# Patient Record
Sex: MALE | Race: WHITE | NOT HISPANIC OR LATINO | Employment: OTHER | URBAN - NONMETROPOLITAN AREA
[De-identification: names, ages, dates, MRNs, and addresses within clinical notes are randomized per-mention and may not be internally consistent; named-entity substitution may affect disease eponyms.]

---

## 2017-08-07 ENCOUNTER — OFFICE VISIT (OUTPATIENT)
Dept: CARDIAC SURGERY | Facility: CLINIC | Age: 57
End: 2017-08-07

## 2017-08-07 VITALS
DIASTOLIC BLOOD PRESSURE: 75 MMHG | HEIGHT: 77 IN | BODY MASS INDEX: 37.19 KG/M2 | HEART RATE: 90 BPM | SYSTOLIC BLOOD PRESSURE: 187 MMHG | WEIGHT: 315 LBS | OXYGEN SATURATION: 93 %

## 2017-08-07 DIAGNOSIS — I10 ESSENTIAL HYPERTENSION: ICD-10-CM

## 2017-08-07 DIAGNOSIS — Z79.4 TYPE 2 DIABETES MELLITUS WITH DIABETIC PERIPHERAL ANGIOPATHY WITHOUT GANGRENE, WITH LONG-TERM CURRENT USE OF INSULIN (HCC): ICD-10-CM

## 2017-08-07 DIAGNOSIS — I63.9 CEREBROVASCULAR ACCIDENT (CVA), UNSPECIFIED MECHANISM (HCC): ICD-10-CM

## 2017-08-07 DIAGNOSIS — J43.1 PANLOBULAR EMPHYSEMA (HCC): ICD-10-CM

## 2017-08-07 DIAGNOSIS — E11.51 TYPE 2 DIABETES MELLITUS WITH DIABETIC PERIPHERAL ANGIOPATHY WITHOUT GANGRENE, WITH LONG-TERM CURRENT USE OF INSULIN (HCC): ICD-10-CM

## 2017-08-07 DIAGNOSIS — I73.9 PVD (PERIPHERAL VASCULAR DISEASE) (HCC): ICD-10-CM

## 2017-08-07 DIAGNOSIS — I70.219 ATHEROSCLEROSIS OF ARTERY OF EXTREMITY WITH INTERMITTENT CLAUDICATION (HCC): Primary | ICD-10-CM

## 2017-08-07 DIAGNOSIS — IMO0001 CLASS 3 OBESITY DUE TO EXCESS CALORIES WITH SERIOUS COMORBIDITY AND BODY MASS INDEX (BMI) OF 40.0 TO 44.9 IN ADULT: ICD-10-CM

## 2017-08-07 DIAGNOSIS — E78.2 MIXED HYPERLIPIDEMIA: ICD-10-CM

## 2017-08-07 PROCEDURE — 99204 OFFICE O/P NEW MOD 45 MIN: CPT | Performed by: THORACIC SURGERY (CARDIOTHORACIC VASCULAR SURGERY)

## 2017-08-07 RX ORDER — LISINOPRIL 40 MG/1
40 TABLET ORAL DAILY
COMMUNITY

## 2017-08-07 RX ORDER — GABAPENTIN 600 MG/1
600 TABLET ORAL 3 TIMES DAILY
COMMUNITY

## 2017-08-07 RX ORDER — ISOSORBIDE MONONITRATE 30 MG/1
30 TABLET, EXTENDED RELEASE ORAL DAILY
COMMUNITY

## 2017-08-07 RX ORDER — INSULIN GLARGINE 100 [IU]/ML
INJECTION, SOLUTION SUBCUTANEOUS DAILY
COMMUNITY

## 2017-08-07 RX ORDER — HYDRALAZINE HYDROCHLORIDE 25 MG/1
25 TABLET, FILM COATED ORAL 3 TIMES DAILY
COMMUNITY

## 2017-08-07 RX ORDER — BUDESONIDE AND FORMOTEROL FUMARATE DIHYDRATE 160; 4.5 UG/1; UG/1
2 AEROSOL RESPIRATORY (INHALATION)
COMMUNITY

## 2017-08-07 RX ORDER — FENOFIBRATE 145 MG/1
145 TABLET, COATED ORAL DAILY
COMMUNITY

## 2017-08-07 RX ORDER — ASPIRIN 81 MG/1
81 TABLET ORAL DAILY
COMMUNITY

## 2017-08-07 RX ORDER — DULOXETIN HYDROCHLORIDE 60 MG/1
60 CAPSULE, DELAYED RELEASE ORAL DAILY
COMMUNITY

## 2017-08-07 RX ORDER — CARVEDILOL 12.5 MG/1
12.5 TABLET ORAL 2 TIMES DAILY WITH MEALS
COMMUNITY

## 2017-08-07 RX ORDER — ALBUTEROL SULFATE 90 UG/1
2 AEROSOL, METERED RESPIRATORY (INHALATION) EVERY 4 HOURS PRN
COMMUNITY

## 2017-08-07 RX ORDER — CILOSTAZOL 100 MG/1
100 TABLET ORAL
Qty: 60 TABLET | Refills: 11 | Status: SHIPPED | OUTPATIENT
Start: 2017-08-07 | End: 2018-01-01

## 2017-08-07 RX ORDER — AMLODIPINE BESYLATE 10 MG/1
10 TABLET ORAL DAILY
COMMUNITY

## 2017-08-07 RX ORDER — SPIRONOLACTONE 25 MG/1
25 TABLET ORAL DAILY
COMMUNITY

## 2017-08-07 RX ORDER — ATORVASTATIN CALCIUM 10 MG/1
10 TABLET, FILM COATED ORAL DAILY
COMMUNITY

## 2017-08-07 RX ORDER — HYDROCODONE BITARTRATE AND ACETAMINOPHEN 10; 325 MG/1; MG/1
TABLET ORAL
Refills: 0 | COMMUNITY
Start: 2017-07-05

## 2017-08-08 LAB
BH CV LOWER ARTERIAL LEFT ABI RATIO: 1.03
BH CV LOWER ARTERIAL LEFT DORSALIS PEDIS SYS MAX: 137 MMHG
BH CV LOWER ARTERIAL LEFT POST TIBIAL SYS MAX: 158 MMHG
BH CV LOWER ARTERIAL RIGHT ABI RATIO: 0.71
BH CV LOWER ARTERIAL RIGHT DORSALIS PEDIS SYS MAX: 93 MMHG
BH CV LOWER ARTERIAL RIGHT POST TIBIAL SYS MAX: 108 MMHG
UPPER ARTERIAL LEFT ARM BRACHIAL SYS MAX: 164 MMHG
UPPER ARTERIAL RIGHT ARM BRACHIAL SYS MAX: 153 MMHG

## 2017-11-19 PROBLEM — I73.9 PERIPHERAL VASCULAR DISEASE (HCC): Status: ACTIVE | Noted: 2017-11-19

## 2017-11-19 PROBLEM — I70.219 ATHEROSCLEROSIS OF ARTERY OF EXTREMITY WITH INTERMITTENT CLAUDICATION (HCC): Status: ACTIVE | Noted: 2017-11-19

## 2017-11-19 PROBLEM — E11.9 DIABETES MELLITUS (HCC): Status: ACTIVE | Noted: 2017-11-19

## 2017-11-19 PROBLEM — IMO0001 CLASS 3 OBESITY DUE TO EXCESS CALORIES WITH BODY MASS INDEX (BMI) OF 40.0 TO 44.9 IN ADULT: Status: ACTIVE | Noted: 2017-11-19

## 2017-11-19 PROBLEM — J44.9 COPD (CHRONIC OBSTRUCTIVE PULMONARY DISEASE) (HCC): Status: ACTIVE | Noted: 2017-11-19

## 2017-11-19 PROBLEM — I10 HYPERTENSION: Status: ACTIVE | Noted: 2017-11-19

## 2017-11-19 PROBLEM — I63.9 CEREBROVASCULAR ACCIDENT (HCC): Status: ACTIVE | Noted: 2017-11-19

## 2017-11-19 PROBLEM — E66.9 OBESITY: Status: ACTIVE | Noted: 2017-11-19

## 2017-11-19 PROBLEM — E78.5 HYPERLIPIDEMIA: Status: ACTIVE | Noted: 2017-11-19

## 2017-11-19 NOTE — PROGRESS NOTES
8/7/2017    Steven R Midkiff  1960    Chief Complaint:    Chief Complaint   Patient presents with   • Peripheral Vascular Disease       HPI:      PCP:  Jhony Flores MD, Nasir LIZ    57 y.o. male with HTN(stable, increased risk stroke), hyperlipidemia(stable, increased risk cardiovascular events), COPD(stable, increased risk pulmonary complications), DM2(stable, increased risk cardiovascular events), Morbid Obesity(uncontrolled, BMI 45, increased risk cardiovascular events), PVD(new, increased risk cardiovascular events).  smokes 1/2 PPD.  Moderate legs hurt all the time x 2 years.  Eases up some with rest.  Venous stasis changes.  Moderate bilateral knee pain.  .  No TIA stroke amaurosis.  No MI claudication. No other associated signs, symptoms or modifying factors.    5/2013 Carotid Duplex:  YUKO 0-49% antegrade vert.  LICA 0-49% antegrade vert.  4/2014 ECG:  NSR 65, QTc 422, LBBB  5/2014 Nuclear myocardial stress test:  EF 53%, no ischemia.  8/7/2017 ANDREA:  RIGHT 0.71 tri/biphasic.  LEFT 1.0 triphasic.    The following portions of the patient's history were reviewed and updated as appropriate: allergies, current medications, past family history, past medical history, past social history, past surgical history and problem list.  Recent images independently reviewed.  Available laboratory values reviewed.    PMH:  Past Medical History:   Diagnosis Date   • Cerebrovascular accident    • COPD (chronic obstructive pulmonary disease)    • Diabetes mellitus    • History of echocardiogram     Technically suboptimally study due to morbid obesity. Mild CLVH with early diastolic dysfunction. EF 60-65%. AV mildly sclerotic. Mitral and tricuspid valves appear to be grossly normal.   • Hyperlipidemia    • Hypertension    • Obesity     morbid   • Peripheral vascular disease        ALLERGIES:  No Known Allergies      MEDICATIONS:    Current Outpatient Prescriptions:   •  albuterol (PROVENTIL HFA;VENTOLIN HFA)  108 (90 BASE) MCG/ACT inhaler, Inhale 2 puffs Every 4 (Four) Hours As Needed for Wheezing., Disp: , Rfl:   •  amLODIPine (NORVASC) 10 MG tablet, Take 10 mg by mouth Daily., Disp: , Rfl:   •  aspirin 81 MG EC tablet, Take 81 mg by mouth Daily., Disp: , Rfl:   •  atorvastatin (LIPITOR) 10 MG tablet, Take 10 mg by mouth Daily., Disp: , Rfl:   •  budesonide-formoterol (SYMBICORT) 160-4.5 MCG/ACT inhaler, Inhale 2 puffs 2 (Two) Times a Day., Disp: , Rfl:   •  carvedilol (COREG) 12.5 MG tablet, Take 12.5 mg by mouth 2 (Two) Times a Day With Meals., Disp: , Rfl:   •  DULoxetine (CYMBALTA) 60 MG capsule, Take 60 mg by mouth Daily., Disp: , Rfl:   •  fenofibrate (TRICOR) 145 MG tablet, Take 145 mg by mouth Daily., Disp: , Rfl:   •  gabapentin (NEURONTIN) 600 MG tablet, Take 600 mg by mouth 3 (Three) Times a Day., Disp: , Rfl:   •  hydrALAZINE (APRESOLINE) 25 MG tablet, Take 25 mg by mouth 3 (Three) Times a Day., Disp: , Rfl:   •  insulin aspart (novoLOG FLEXPEN) 100 UNIT/ML solution pen-injector sc pen, Inject  under the skin 3 (Three) Times a Day With Meals., Disp: , Rfl:   •  Insulin Glargine (LANTUS SOLOSTAR) 100 UNIT/ML injection pen, Inject  under the skin., Disp: , Rfl:   •  insulin glargine (LANTUS) 100 UNIT/ML injection, Inject  under the skin Daily., Disp: , Rfl:   •  isosorbide mononitrate (IMDUR) 30 MG 24 hr tablet, Take 30 mg by mouth Daily., Disp: , Rfl:   •  lisinopril (PRINIVIL,ZESTRIL) 40 MG tablet, Take 40 mg by mouth Daily., Disp: , Rfl:   •  metFORMIN (GLUCOPHAGE) 1000 MG tablet, Take 1,000 mg by mouth 2 (Two) Times a Day With Meals., Disp: , Rfl:   •  spironolactone (ALDACTONE) 25 MG tablet, Take 25 mg by mouth Daily., Disp: , Rfl:   •  Umeclidinium Bromide (INCRUSE ELLIPTA) 62.5 MCG/INH aerosol powder , Inhale., Disp: , Rfl:   •  cilostazol (PLETAL) 100 MG tablet, Take 1 tablet by mouth 2 (Two) Times a Day Before Meals., Disp: 60 tablet, Rfl: 11  •  HYDROcodone-acetaminophen (NORCO)  MG per tablet,  TK 1 T PO Q 6 H PRN FOR PAIN FOR 30 DAYS, Disp: , Rfl: 0    Review of Systems   Review of Systems   Constitution: Positive for weight loss. Negative for malaise/fatigue and night sweats.   HENT: Negative for hearing loss, hoarse voice and stridor.    Eyes: Negative for vision loss in left eye, vision loss in right eye and visual disturbance.   Cardiovascular: Positive for leg swelling. Negative for chest pain and palpitations.   Respiratory: Positive for shortness of breath, sleep disturbances due to breathing and wheezing. Negative for cough and hemoptysis.    Hematologic/Lymphatic: Negative for adenopathy and bleeding problem. Does not bruise/bleed easily.   Skin: Positive for poor wound healing. Negative for color change and rash.   Musculoskeletal: Positive for arthritis, back pain and neck pain. Negative for muscle weakness.   Gastrointestinal: Negative for abdominal pain, dysphagia and heartburn.   Neurological: Negative for dizziness, numbness and seizures.   Psychiatric/Behavioral: Positive for depression. Negative for altered mental status and memory loss. The patient is not nervous/anxious.        Physical Exam   Physical Exam   Constitutional: He is oriented to person, place, and time. He is active and cooperative. He does not appear ill. No distress.   HENT:   Head: Atraumatic.   Right Ear: Hearing normal.   Left Ear: Hearing normal.   Nose: No nasal deformity. No epistaxis.   Mouth/Throat: He does not have dentures. Normal dentition.   Eyes: Conjunctivae and lids are normal. Right pupil is round and reactive. Left pupil is round and reactive.   Neck: No JVD present. Carotid bruit is not present. No tracheal deviation present. No thyroid mass and no thyromegaly present.   Cardiovascular: Normal rate and regular rhythm.    No murmur heard.  Pulses:       Carotid pulses are 2+ on the right side, and 2+ on the left side.       Radial pulses are 2+ on the right side, and 2+ on the left side.        Femoral  pulses are 2+ on the right side, and 2+ on the left side.       Dorsalis pedis pulses are 1+ on the right side, and 2+ on the left side.        Posterior tibial pulses are 1+ on the right side, and 2+ on the left side.   Pulmonary/Chest: Effort normal and breath sounds normal.   Abdominal: Soft. He exhibits no distension and no mass. There is no splenomegaly or hepatomegaly. There is no tenderness.   Musculoskeletal: He exhibits no deformity.   Gait normal.    Lymphadenopathy:     He has no cervical adenopathy.        Right: No supraclavicular adenopathy present.        Left: No supraclavicular adenopathy present.   Neurological: He is alert and oriented to person, place, and time. He has normal strength.   Skin: Skin is warm and dry. No cyanosis or erythema. No pallor.   mild venous staining  Callous 1st toes, fungal nails.   Psychiatric: He has a normal mood and affect. His speech is normal. Judgment and thought content normal.     Bun 11 creat 0.7    ASSESSMENT:  Tayo was seen today for peripheral vascular disease.    Diagnoses and all orders for this visit:    Atherosclerosis of artery of extremity with intermittent claudication    PVD (peripheral vascular disease)  -     Doppler Ankle Brachial Index Single Level CAR    Cerebrovascular accident (CVA), unspecified mechanism    Mixed hyperlipidemia    Essential hypertension    Panlobular emphysema    Class 3 obesity due to excess calories with serious comorbidity and body mass index (BMI) of 40.0 to 44.9 in adult    Type 2 diabetes mellitus with diabetic peripheral angiopathy without gangrene, with long-term current use of insulin    Other orders  -     cilostazol (PLETAL) 100 MG tablet; Take 1 tablet by mouth 2 (Two) Times a Day Before Meals.    PLAN:  Detailed discussion with Steven R Midkiff regarding situation and options.  Moderate PVD with claudication.  Multiple risk factors with severe comorbidities.  No intervention indicated at this time.  Will follow  with interval imaging.  Risks, benefits discussed.  Understands and wishes to proceed with plan.    Return in 6 months with ANDREA  Pletal 100mg BID    Return after above studies complete  Recommended regular physical activity, progressive walking program.  Continue current medications as directed.  Will Obtain relevant old records.    Thank you for the opportunity to participate in this patient's care.    Copy to primary care provider.    EMR Dragon/Transcription disclaimer:   Much of this encounter note is an electronic transcription/translation of spoken language to printed text. The electronic translation of spoken language may permit erroneous, or at times, nonsensical words or phrases to be inadvertently transcribed; Although I have reviewed the note for such errors, some may still exist.

## 2017-12-05 ENCOUNTER — OFFICE VISIT (OUTPATIENT)
Dept: SLEEP MEDICINE | Facility: HOSPITAL | Age: 57
End: 2017-12-05

## 2017-12-05 VITALS
WEIGHT: 315 LBS | BODY MASS INDEX: 37.19 KG/M2 | DIASTOLIC BLOOD PRESSURE: 76 MMHG | HEIGHT: 77 IN | SYSTOLIC BLOOD PRESSURE: 134 MMHG

## 2017-12-05 DIAGNOSIS — G25.81 RESTLESS LEGS SYNDROME (RLS): ICD-10-CM

## 2017-12-05 DIAGNOSIS — G47.33 OBSTRUCTIVE SLEEP APNEA, ADULT: Primary | ICD-10-CM

## 2017-12-05 PROCEDURE — 99213 OFFICE O/P EST LOW 20 MIN: CPT | Performed by: INTERNAL MEDICINE

## 2017-12-05 NOTE — PROGRESS NOTES
Sleep Clinic Follow Up    Date: 2017  Primary Care Physician: Jhony Flores MD      Interim History (1/3):  Since the last visit on 2016, patient has:    EFREM - uses a machine that was gifted to him.  Unfortunately he does not have a data card and he did not bring the machine in for evaluation.  He denies mask and machine issues, dry mouth, headaches, pressures intolerance, or non-compliance.  He uses nasal pillows and gets his supplies from [employer].  He goes to bed around 0500 and sleeps until 0800. He sleeps again from 3072-3977  He denies significant caffeine or alcohol intake.  He does not nap other than the aforementioned time.  He has restless leg symptoms that are persistent but mild    PMHx, FH, SH reviewed and pertinent changes are:  unchanged from last office visit on 2016      REVIEW OF SYSTEMS:   Negative for chest pain, fever, chills, SOA, abdominal pain. Smokin/2 ppd      Exam (-):    Vitals:    17 1518   BP: 134/76     HR - 4  RR - 18    Body mass index is 41.5 kg/(m^2).  Gen:  No distress, conversant, pleasant, appears stated age, alert, oriented, disheveled,   Eyes:   Anicteric sclera, moist conjunctiva, no lid lag     PERRLA, EOMI   Heent:   NC/AT    Oropharynx clear, Mallampati 4    Reduced earing    Lungs:  Normal effort, non-labored breathing    Clear to auscultation    CV:  Normal S1/S2, no murmur    2+ lower extremity edema  ABD:  Soft, normal bowel sounds       Psych:  Appropriate affect  Neuro:  CN 2-12 intact, as is a cane for balance    Past Medical History:   Diagnosis Date   • Cerebrovascular accident    • COPD (chronic obstructive pulmonary disease)    • Diabetes mellitus    • History of echocardiogram     Technically suboptimally study due to morbid obesity. Mild CLVH with early diastolic dysfunction. EF 60-65%. AV mildly sclerotic. Mitral and tricuspid valves appear to be grossly normal.   • Hyperlipidemia    • Hypertension    • Obesity      morbid   • Peripheral vascular disease        Current Outpatient Prescriptions:   •  albuterol (PROVENTIL HFA;VENTOLIN HFA) 108 (90 BASE) MCG/ACT inhaler, Inhale 2 puffs Every 4 (Four) Hours As Needed for Wheezing., Disp: , Rfl:   •  amLODIPine (NORVASC) 10 MG tablet, Take 10 mg by mouth Daily., Disp: , Rfl:   •  aspirin 81 MG EC tablet, Take 81 mg by mouth Daily., Disp: , Rfl:   •  atorvastatin (LIPITOR) 10 MG tablet, Take 10 mg by mouth Daily., Disp: , Rfl:   •  budesonide-formoterol (SYMBICORT) 160-4.5 MCG/ACT inhaler, Inhale 2 puffs 2 (Two) Times a Day., Disp: , Rfl:   •  carvedilol (COREG) 12.5 MG tablet, Take 12.5 mg by mouth 2 (Two) Times a Day With Meals., Disp: , Rfl:   •  cilostazol (PLETAL) 100 MG tablet, Take 1 tablet by mouth 2 (Two) Times a Day Before Meals., Disp: 60 tablet, Rfl: 11  •  DULoxetine (CYMBALTA) 60 MG capsule, Take 60 mg by mouth Daily., Disp: , Rfl:   •  fenofibrate (TRICOR) 145 MG tablet, Take 145 mg by mouth Daily., Disp: , Rfl:   •  gabapentin (NEURONTIN) 600 MG tablet, Take 600 mg by mouth 3 (Three) Times a Day., Disp: , Rfl:   •  hydrALAZINE (APRESOLINE) 25 MG tablet, Take 25 mg by mouth 3 (Three) Times a Day., Disp: , Rfl:   •  HYDROcodone-acetaminophen (NORCO)  MG per tablet, TK 1 T PO Q 6 H PRN FOR PAIN FOR 30 DAYS, Disp: , Rfl: 0  •  insulin aspart (novoLOG FLEXPEN) 100 UNIT/ML solution pen-injector sc pen, Inject  under the skin 3 (Three) Times a Day With Meals., Disp: , Rfl:   •  Insulin Glargine (LANTUS SOLOSTAR) 100 UNIT/ML injection pen, Inject  under the skin., Disp: , Rfl:   •  insulin glargine (LANTUS) 100 UNIT/ML injection, Inject  under the skin Daily., Disp: , Rfl:   •  isosorbide mononitrate (IMDUR) 30 MG 24 hr tablet, Take 30 mg by mouth Daily., Disp: , Rfl:   •  lisinopril (PRINIVIL,ZESTRIL) 40 MG tablet, Take 40 mg by mouth Daily., Disp: , Rfl:   •  metFORMIN (GLUCOPHAGE) 1000 MG tablet, Take 1,000 mg by mouth 2 (Two) Times a Day With Meals., Disp: , Rfl:    •  spironolactone (ALDACTONE) 25 MG tablet, Take 25 mg by mouth Daily., Disp: , Rfl:   •  Umeclidinium Bromide (INCRUSE ELLIPTA) 62.5 MCG/INH aerosol powder , Inhale., Disp: , Rfl:       ASSESSMENT / PLAN:     1. Obstructive sleep apnea  1. PSG on 03/19/2007, AHI of 6.1  2. CPAP titration on same day, recommended 17/8 cm H2O  3. Currently on ? cm H2O  4. Continue PAP as prescribed.   5. Script for PAP supplies  6. Return to clinic in 1 year with compliance check unless sx change in the interim period.  2. Restless leg syndrome/ /Balderrama-Ekbom disease (RLS/WED)  - mild no therapy at this point  3. COPD and smoking - not interested in quitting at this point.        Total time 15 min, more than half spent in face to face counseling and coordination of care.     This document has been electronically signed by Jerry William MD on December 5, 2017         CC: Jhony Flores MD          No ref. provider found

## 2018-01-01 ENCOUNTER — OFFICE VISIT (OUTPATIENT)
Dept: PODIATRY | Facility: CLINIC | Age: 58
End: 2018-01-01

## 2018-01-01 ENCOUNTER — OFFICE VISIT (OUTPATIENT)
Dept: WOUND CARE | Facility: HOSPITAL | Age: 58
End: 2018-01-01

## 2018-01-01 ENCOUNTER — APPOINTMENT (OUTPATIENT)
Dept: WOUND CARE | Facility: HOSPITAL | Age: 58
End: 2018-01-01

## 2018-01-01 ENCOUNTER — OFFICE VISIT (OUTPATIENT)
Dept: CARDIAC SURGERY | Facility: CLINIC | Age: 58
End: 2018-01-01

## 2018-01-01 ENCOUNTER — LAB REQUISITION (OUTPATIENT)
Dept: LAB | Facility: HOSPITAL | Age: 58
End: 2018-01-01

## 2018-01-01 ENCOUNTER — HOSPITAL ENCOUNTER (OUTPATIENT)
Dept: GENERAL RADIOLOGY | Facility: HOSPITAL | Age: 58
Discharge: HOME | End: 2018-04-04
Admitting: NURSE PRACTITIONER

## 2018-01-01 ENCOUNTER — HOSPITAL ENCOUNTER (OUTPATIENT)
Dept: MRI IMAGING | Facility: HOSPITAL | Age: 58
Discharge: HOME | End: 2018-06-05

## 2018-01-01 ENCOUNTER — TRANSCRIBE ORDERS (OUTPATIENT)
Dept: CARDIAC SURGERY | Facility: CLINIC | Age: 58
End: 2018-01-01

## 2018-01-01 ENCOUNTER — APPOINTMENT (OUTPATIENT)
Dept: WOUND CARE | Facility: HOSPITAL | Age: 58
End: 2018-01-01
Attending: THORACIC SURGERY (CARDIOTHORACIC VASCULAR SURGERY)

## 2018-01-01 ENCOUNTER — TRANSCRIBE ORDERS (OUTPATIENT)
Dept: ADMINISTRATIVE | Facility: HOSPITAL | Age: 58
End: 2018-01-01

## 2018-01-01 VITALS — HEIGHT: 77 IN | WEIGHT: 315 LBS | BODY MASS INDEX: 37.19 KG/M2

## 2018-01-01 VITALS — BODY MASS INDEX: 37.19 KG/M2 | WEIGHT: 315 LBS | HEIGHT: 77 IN

## 2018-01-01 VITALS — HEART RATE: 97 BPM | WEIGHT: 315 LBS | OXYGEN SATURATION: 93 % | HEIGHT: 77 IN | BODY MASS INDEX: 37.19 KG/M2

## 2018-01-01 VITALS — OXYGEN SATURATION: 96 % | HEIGHT: 77 IN | BODY MASS INDEX: 37.19 KG/M2 | WEIGHT: 315 LBS | HEART RATE: 85 BPM

## 2018-01-01 DIAGNOSIS — L97.512 SKIN ULCER OF RIGHT FOOT WITH FAT LAYER EXPOSED (HCC): Primary | ICD-10-CM

## 2018-01-01 DIAGNOSIS — E11.42 DIABETIC POLYNEUROPATHY ASSOCIATED WITH TYPE 2 DIABETES MELLITUS (HCC): ICD-10-CM

## 2018-01-01 DIAGNOSIS — L97.512 SKIN ULCER OF RIGHT FOOT WITH FAT LAYER EXPOSED (HCC): ICD-10-CM

## 2018-01-01 DIAGNOSIS — I70.219 ATHEROSCLEROSIS OF ARTERY OF EXTREMITY WITH INTERMITTENT CLAUDICATION (HCC): ICD-10-CM

## 2018-01-01 DIAGNOSIS — I79.8 OTHER DISORDERS OF ARTERIES, ARTERIOLES AND CAPILLARIES IN DISEASES CLASSIFIED ELSEWHERE (HCC): Primary | ICD-10-CM

## 2018-01-01 DIAGNOSIS — I73.9 PERIPHERAL VASCULAR DISEASE (HCC): Primary | ICD-10-CM

## 2018-01-01 DIAGNOSIS — E11.621 TYPE 2 DIABETES MELLITUS WITH FOOT ULCER (CODE) (HCC): ICD-10-CM

## 2018-01-01 DIAGNOSIS — E11.621 DIABETIC FOOT ULCER WITH OSTEOMYELITIS (HCC): ICD-10-CM

## 2018-01-01 DIAGNOSIS — I63.9 CEREBROVASCULAR ACCIDENT (CVA), UNSPECIFIED MECHANISM (HCC): ICD-10-CM

## 2018-01-01 DIAGNOSIS — I10 ESSENTIAL HYPERTENSION: ICD-10-CM

## 2018-01-01 DIAGNOSIS — M86.171 ACUTE OSTEOMYELITIS INVOLVING ANKLE AND FOOT, RIGHT (HCC): ICD-10-CM

## 2018-01-01 DIAGNOSIS — E11.69 DIABETIC FOOT ULCER WITH OSTEOMYELITIS (HCC): ICD-10-CM

## 2018-01-01 DIAGNOSIS — E78.2 MIXED HYPERLIPIDEMIA: ICD-10-CM

## 2018-01-01 DIAGNOSIS — J43.1 PANLOBULAR EMPHYSEMA (HCC): ICD-10-CM

## 2018-01-01 DIAGNOSIS — IMO0001 CLASS 3 OBESITY DUE TO EXCESS CALORIES WITH SERIOUS COMORBIDITY AND BODY MASS INDEX (BMI) OF 40.0 TO 44.9 IN ADULT: ICD-10-CM

## 2018-01-01 DIAGNOSIS — S90.425A TOE BLISTER WITHOUT INFECTION, LEFT, INITIAL ENCOUNTER: Primary | ICD-10-CM

## 2018-01-01 DIAGNOSIS — M86.9 DIABETIC FOOT ULCER WITH OSTEOMYELITIS (HCC): ICD-10-CM

## 2018-01-01 DIAGNOSIS — L97.509 DIABETIC FOOT ULCER WITH OSTEOMYELITIS (HCC): ICD-10-CM

## 2018-01-01 LAB
ALBUMIN SERPL-MCNC: 4 G/DL (ref 3.4–4.8)
ALBUMIN/GLOB SERPL: 1.2 G/DL (ref 1.1–1.8)
ALP SERPL-CCNC: 83 U/L (ref 38–126)
ALT SERPL W P-5'-P-CCNC: 15 U/L (ref 21–72)
ANION GAP SERPL CALCULATED.3IONS-SCNC: 10 MMOL/L (ref 5–15)
AST SERPL-CCNC: 17 U/L (ref 17–59)
BACTERIA SPEC AEROBE CULT: ABNORMAL
BASOPHILS # BLD AUTO: 0.03 10*3/MM3 (ref 0–0.2)
BASOPHILS NFR BLD AUTO: 0.3 % (ref 0–2)
BILIRUB SERPL-MCNC: 0.7 MG/DL (ref 0.2–1.3)
BUN BLD-MCNC: 11 MG/DL (ref 7–21)
BUN/CREAT SERPL: 19.3 (ref 7–25)
CALCIUM SPEC-SCNC: 8.4 MG/DL (ref 8.4–10.2)
CHLORIDE SERPL-SCNC: 98 MMOL/L (ref 95–110)
CO2 SERPL-SCNC: 29 MMOL/L (ref 22–31)
CREAT BLD-MCNC: 0.57 MG/DL (ref 0.7–1.3)
CRP SERPL-MCNC: 6.7 MG/DL (ref 0–1)
DEPRECATED RDW RBC AUTO: 43.6 FL (ref 35.1–43.9)
EOSINOPHIL # BLD AUTO: 0.26 10*3/MM3 (ref 0–0.7)
EOSINOPHIL NFR BLD AUTO: 2.8 % (ref 0–7)
ERYTHROCYTE [DISTWIDTH] IN BLOOD BY AUTOMATED COUNT: 14.2 % (ref 11.5–14.5)
ERYTHROCYTE [SEDIMENTATION RATE] IN BLOOD: 47 MM/HR (ref 0–15)
GFR SERPL CREATININE-BSD FRML MDRD: 147 ML/MIN/1.73 (ref 56–130)
GLOBULIN UR ELPH-MCNC: 3.4 GM/DL (ref 2.3–3.5)
GLUCOSE BLD-MCNC: 330 MG/DL (ref 60–100)
GRAM STN SPEC: ABNORMAL
GRAM STN SPEC: ABNORMAL
HBA1C MFR BLD: 10.4 % (ref 4–5.6)
HCT VFR BLD AUTO: 44.7 % (ref 39–49)
HGB BLD-MCNC: 15.2 G/DL (ref 13.7–17.3)
IMM GRANULOCYTES # BLD: 0.02 10*3/MM3 (ref 0–0.02)
IMM GRANULOCYTES NFR BLD: 0.2 % (ref 0–0.5)
LYMPHOCYTES # BLD AUTO: 1.32 10*3/MM3 (ref 0.6–4.2)
LYMPHOCYTES NFR BLD AUTO: 14.1 % (ref 10–50)
MCH RBC QN AUTO: 29.1 PG (ref 26.5–34)
MCHC RBC AUTO-ENTMCNC: 34 G/DL (ref 31.5–36.3)
MCV RBC AUTO: 85.5 FL (ref 80–98)
MONOCYTES # BLD AUTO: 0.56 10*3/MM3 (ref 0–0.9)
MONOCYTES NFR BLD AUTO: 6 % (ref 0–12)
NEUTROPHILS # BLD AUTO: 7.16 10*3/MM3 (ref 2–8.6)
NEUTROPHILS NFR BLD AUTO: 76.6 % (ref 37–80)
PLATELET # BLD AUTO: 180 10*3/MM3 (ref 150–450)
PMV BLD AUTO: 11.3 FL (ref 8–12)
POTASSIUM BLD-SCNC: 4 MMOL/L (ref 3.5–5.1)
PREALB SERPL-MCNC: 10.2 MG/DL (ref 17.6–36)
PROT SERPL-MCNC: 7.4 G/DL (ref 6.3–8.6)
RBC # BLD AUTO: 5.23 10*6/MM3 (ref 4.37–5.74)
SODIUM BLD-SCNC: 137 MMOL/L (ref 137–145)
WBC NRBC COR # BLD: 9.35 10*3/MM3 (ref 3.2–9.8)

## 2018-01-01 PROCEDURE — 85651 RBC SED RATE NONAUTOMATED: CPT | Performed by: NURSE PRACTITIONER

## 2018-01-01 PROCEDURE — 11056 PARNG/CUTG B9 HYPRKR LES 2-4: CPT

## 2018-01-01 PROCEDURE — 87186 SC STD MICRODIL/AGAR DIL: CPT | Performed by: NURSE PRACTITIONER

## 2018-01-01 PROCEDURE — 99214 OFFICE O/P EST MOD 30 MIN: CPT | Performed by: THORACIC SURGERY (CARDIOTHORACIC VASCULAR SURGERY)

## 2018-01-01 PROCEDURE — 11042 DBRDMT SUBQ TIS 1ST 20SQCM/<: CPT | Performed by: PODIATRIST

## 2018-01-01 PROCEDURE — 84134 ASSAY OF PREALBUMIN: CPT | Performed by: NURSE PRACTITIONER

## 2018-01-01 PROCEDURE — 87077 CULTURE AEROBIC IDENTIFY: CPT | Performed by: NURSE PRACTITIONER

## 2018-01-01 PROCEDURE — 73630 X-RAY EXAM OF FOOT: CPT

## 2018-01-01 PROCEDURE — 83036 HEMOGLOBIN GLYCOSYLATED A1C: CPT | Performed by: NURSE PRACTITIONER

## 2018-01-01 PROCEDURE — 87205 SMEAR GRAM STAIN: CPT | Performed by: NURSE PRACTITIONER

## 2018-01-01 PROCEDURE — 87147 CULTURE TYPE IMMUNOLOGIC: CPT | Performed by: NURSE PRACTITIONER

## 2018-01-01 PROCEDURE — 86140 C-REACTIVE PROTEIN: CPT | Performed by: NURSE PRACTITIONER

## 2018-01-01 PROCEDURE — 97602 WOUND(S) CARE NON-SELECTIVE: CPT

## 2018-01-01 PROCEDURE — 85025 COMPLETE CBC W/AUTO DIFF WBC: CPT | Performed by: NURSE PRACTITIONER

## 2018-01-01 PROCEDURE — 99213 OFFICE O/P EST LOW 20 MIN: CPT | Performed by: PODIATRIST

## 2018-01-01 PROCEDURE — 11055 PARING/CUTG B9 HYPRKER LES 1: CPT

## 2018-01-01 PROCEDURE — 10060 I&D ABSCESS SIMPLE/SINGLE: CPT | Performed by: PODIATRIST

## 2018-01-01 PROCEDURE — 87070 CULTURE OTHR SPECIMN AEROBIC: CPT | Performed by: NURSE PRACTITIONER

## 2018-01-01 PROCEDURE — 80053 COMPREHEN METABOLIC PANEL: CPT | Performed by: NURSE PRACTITIONER

## 2018-01-24 ENCOUNTER — OFFICE VISIT (OUTPATIENT)
Dept: WOUND CARE | Facility: HOSPITAL | Age: 58
End: 2018-01-24

## 2018-01-24 ENCOUNTER — LAB REQUISITION (OUTPATIENT)
Dept: LAB | Facility: HOSPITAL | Age: 58
End: 2018-01-24

## 2018-01-24 ENCOUNTER — HOSPITAL ENCOUNTER (OUTPATIENT)
Dept: GENERAL RADIOLOGY | Facility: HOSPITAL | Age: 58
Discharge: HOME | End: 2018-01-24
Admitting: NURSE PRACTITIONER

## 2018-01-24 DIAGNOSIS — S91.302A OPEN WOUND OF LEFT FOOT: ICD-10-CM

## 2018-01-24 DIAGNOSIS — S91.301A OPEN WOUND OF RIGHT FOOT: ICD-10-CM

## 2018-01-24 LAB
ALBUMIN SERPL-MCNC: 4 G/DL (ref 3.4–4.8)
ALBUMIN/GLOB SERPL: 1.2 G/DL (ref 1.1–1.8)
ALP SERPL-CCNC: 96 U/L (ref 38–126)
ALT SERPL W P-5'-P-CCNC: 20 U/L (ref 21–72)
ANION GAP SERPL CALCULATED.3IONS-SCNC: 11 MMOL/L (ref 5–15)
AST SERPL-CCNC: 15 U/L (ref 17–59)
BASOPHILS # BLD AUTO: 0.05 10*3/MM3 (ref 0–0.2)
BASOPHILS NFR BLD AUTO: 0.6 % (ref 0–2)
BILIRUB SERPL-MCNC: 0.7 MG/DL (ref 0.2–1.3)
BUN BLD-MCNC: 7 MG/DL (ref 7–21)
BUN/CREAT SERPL: 12.7 (ref 7–25)
CALCIUM SPEC-SCNC: 8.9 MG/DL (ref 8.4–10.2)
CHLORIDE SERPL-SCNC: 96 MMOL/L (ref 95–110)
CO2 SERPL-SCNC: 33 MMOL/L (ref 22–31)
CREAT BLD-MCNC: 0.55 MG/DL (ref 0.7–1.3)
DEPRECATED RDW RBC AUTO: 45 FL (ref 35.1–43.9)
EOSINOPHIL # BLD AUTO: 0.11 10*3/MM3 (ref 0–0.7)
EOSINOPHIL NFR BLD AUTO: 1.2 % (ref 0–7)
ERYTHROCYTE [DISTWIDTH] IN BLOOD BY AUTOMATED COUNT: 14.1 % (ref 11.5–14.5)
GFR SERPL CREATININE-BSD FRML MDRD: >150 ML/MIN/1.73 (ref 60–130)
GLOBULIN UR ELPH-MCNC: 3.3 GM/DL (ref 2.3–3.5)
GLUCOSE BLD-MCNC: 425 MG/DL (ref 60–100)
HBA1C MFR BLD: 11 % (ref 4–5.6)
HCT VFR BLD AUTO: 48.5 % (ref 39–49)
HGB BLD-MCNC: 16.2 G/DL (ref 13.7–17.3)
IMM GRANULOCYTES # BLD: 0.03 10*3/MM3 (ref 0–0.02)
IMM GRANULOCYTES NFR BLD: 0.3 % (ref 0–0.5)
LYMPHOCYTES # BLD AUTO: 1.18 10*3/MM3 (ref 0.6–4.2)
LYMPHOCYTES NFR BLD AUTO: 13.2 % (ref 10–50)
MCH RBC QN AUTO: 29.1 PG (ref 26.5–34)
MCHC RBC AUTO-ENTMCNC: 33.4 G/DL (ref 31.5–36.3)
MCV RBC AUTO: 87.2 FL (ref 80–98)
MONOCYTES # BLD AUTO: 0.47 10*3/MM3 (ref 0–0.9)
MONOCYTES NFR BLD AUTO: 5.3 % (ref 0–12)
NEUTROPHILS # BLD AUTO: 7.1 10*3/MM3 (ref 2–8.6)
NEUTROPHILS NFR BLD AUTO: 79.4 % (ref 37–80)
PLATELET # BLD AUTO: 194 10*3/MM3 (ref 150–450)
PMV BLD AUTO: 11.2 FL (ref 8–12)
POTASSIUM BLD-SCNC: 4 MMOL/L (ref 3.5–5.1)
PROT SERPL-MCNC: 7.3 G/DL (ref 6.3–8.6)
RBC # BLD AUTO: 5.56 10*6/MM3 (ref 4.37–5.74)
SODIUM BLD-SCNC: 140 MMOL/L (ref 137–145)
WBC NRBC COR # BLD: 8.94 10*3/MM3 (ref 3.2–9.8)

## 2018-01-24 PROCEDURE — G0463 HOSPITAL OUTPT CLINIC VISIT: HCPCS

## 2018-01-24 PROCEDURE — 83036 HEMOGLOBIN GLYCOSYLATED A1C: CPT | Performed by: NURSE PRACTITIONER

## 2018-01-24 PROCEDURE — 73630 X-RAY EXAM OF FOOT: CPT

## 2018-01-24 PROCEDURE — 85025 COMPLETE CBC W/AUTO DIFF WBC: CPT | Performed by: NURSE PRACTITIONER

## 2018-01-24 PROCEDURE — 80053 COMPREHEN METABOLIC PANEL: CPT | Performed by: NURSE PRACTITIONER

## 2018-01-24 PROCEDURE — 36415 COLL VENOUS BLD VENIPUNCTURE: CPT | Performed by: NURSE PRACTITIONER

## 2018-01-30 ENCOUNTER — TRANSCRIBE ORDERS (OUTPATIENT)
Dept: ADMINISTRATIVE | Facility: HOSPITAL | Age: 58
End: 2018-01-30

## 2018-01-30 DIAGNOSIS — I79.8 PERIPHERAL ANGIOPATHY IN DISEASES CLASSIFIED ELSEWHERE (HCC): Primary | ICD-10-CM

## 2018-01-31 ENCOUNTER — APPOINTMENT (OUTPATIENT)
Dept: WOUND CARE | Facility: HOSPITAL | Age: 58
End: 2018-01-31

## 2018-01-31 ENCOUNTER — LAB REQUISITION (OUTPATIENT)
Dept: LAB | Facility: HOSPITAL | Age: 58
End: 2018-01-31

## 2018-01-31 DIAGNOSIS — S91.302A OPEN WOUND OF LEFT FOOT: ICD-10-CM

## 2018-01-31 LAB — GLUCOSE BLDC GLUCOMTR-MCNC: 263 MG/DL (ref 70–130)

## 2018-01-31 PROCEDURE — 87205 SMEAR GRAM STAIN: CPT | Performed by: NURSE PRACTITIONER

## 2018-01-31 PROCEDURE — 87147 CULTURE TYPE IMMUNOLOGIC: CPT | Performed by: NURSE PRACTITIONER

## 2018-01-31 PROCEDURE — 82962 GLUCOSE BLOOD TEST: CPT | Performed by: NURSE PRACTITIONER

## 2018-01-31 PROCEDURE — 87186 SC STD MICRODIL/AGAR DIL: CPT | Performed by: NURSE PRACTITIONER

## 2018-01-31 PROCEDURE — 87077 CULTURE AEROBIC IDENTIFY: CPT | Performed by: NURSE PRACTITIONER

## 2018-01-31 PROCEDURE — 87070 CULTURE OTHR SPECIMN AEROBIC: CPT | Performed by: NURSE PRACTITIONER

## 2018-02-04 LAB
BACTERIA SPEC AEROBE CULT: ABNORMAL
GRAM STN SPEC: ABNORMAL

## 2018-02-19 ENCOUNTER — APPOINTMENT (OUTPATIENT)
Dept: WOUND CARE | Facility: HOSPITAL | Age: 58
End: 2018-02-19

## 2018-02-21 ENCOUNTER — APPOINTMENT (OUTPATIENT)
Dept: WOUND CARE | Facility: HOSPITAL | Age: 58
End: 2018-02-21

## 2018-02-21 PROCEDURE — 97602 WOUND(S) CARE NON-SELECTIVE: CPT

## 2018-02-26 ENCOUNTER — OFFICE VISIT (OUTPATIENT)
Dept: PODIATRY | Facility: CLINIC | Age: 58
End: 2018-02-26

## 2018-02-26 VITALS — WEIGHT: 315 LBS | HEIGHT: 77 IN | BODY MASS INDEX: 37.19 KG/M2

## 2018-02-26 DIAGNOSIS — E11.42 DIABETIC POLYNEUROPATHY ASSOCIATED WITH TYPE 2 DIABETES MELLITUS (HCC): ICD-10-CM

## 2018-02-26 DIAGNOSIS — L97.512 SKIN ULCER OF RIGHT FOOT WITH FAT LAYER EXPOSED (HCC): Primary | ICD-10-CM

## 2018-02-26 PROCEDURE — 11042 DBRDMT SUBQ TIS 1ST 20SQCM/<: CPT | Performed by: PODIATRIST

## 2018-02-26 PROCEDURE — 99203 OFFICE O/P NEW LOW 30 MIN: CPT | Performed by: PODIATRIST

## 2018-02-28 ENCOUNTER — APPOINTMENT (OUTPATIENT)
Dept: WOUND CARE | Facility: HOSPITAL | Age: 58
End: 2018-02-28

## 2018-04-05 NOTE — PROGRESS NOTES
Steven R Midkiff  1960  57 y.o. male   PCP: Patient states he has a new provider and doesn't know the name.  BS: 156 this morning per patient   Patient presents with a wound on the left hallux.    04/05/2018    Chief Complaint   Patient presents with   • Left Foot - toe wound           History of Present Illness    Steven R Midkiff is a 57 y.o. male who presents on consultation from the wound care center for evaluation of A possible abscess to the left great toe.  He states that there has been a draining pocket of tissue at the tip of his left big toe for a little over a week.  He is uncertain how it began.  He denies any known trauma or exposure to extreme heat or cold.  He does frequently go barefoot.  He has been wrapping this with gauze which has copious brown tinged drainage at time of exam.    Past Medical History:   Diagnosis Date   • Cerebrovascular accident    • COPD (chronic obstructive pulmonary disease)    • Diabetes mellitus    • History of echocardiogram     Technically suboptimally study due to morbid obesity. Mild CLVH with early diastolic dysfunction. EF 60-65%. AV mildly sclerotic. Mitral and tricuspid valves appear to be grossly normal.   • Hyperlipidemia    • Hypertension    • Obesity     morbid   • Peripheral vascular disease          Past Surgical History:   Procedure Laterality Date   • OTHER SURGICAL HISTORY  08/15/2014    DEBRIDEMENT SKIN/TISSUE 99319 (11         Family History   Problem Relation Age of Onset   • Heart disease Father          Social History     Social History   • Marital status:      Spouse name: N/A   • Number of children: N/A   • Years of education: N/A     Occupational History   • Not on file.     Social History Main Topics   • Smoking status: Current Every Day Smoker     Types: Cigarettes   • Smokeless tobacco: Never Used   • Alcohol use No   • Drug use: No   • Sexual activity: Not on file     Other Topics Concern   • Not on file     Social History Narrative    • No narrative on file         Current Outpatient Prescriptions   Medication Sig Dispense Refill   • albuterol (PROVENTIL HFA;VENTOLIN HFA) 108 (90 BASE) MCG/ACT inhaler Inhale 2 puffs Every 4 (Four) Hours As Needed for Wheezing.     • amLODIPine (NORVASC) 10 MG tablet Take 10 mg by mouth Daily.     • aspirin 81 MG EC tablet Take 81 mg by mouth Daily.     • atorvastatin (LIPITOR) 10 MG tablet Take 10 mg by mouth Daily.     • budesonide-formoterol (SYMBICORT) 160-4.5 MCG/ACT inhaler Inhale 2 puffs 2 (Two) Times a Day.     • carvedilol (COREG) 12.5 MG tablet Take 12.5 mg by mouth 2 (Two) Times a Day With Meals.     • cilostazol (PLETAL) 100 MG tablet Take 1 tablet by mouth 2 (Two) Times a Day Before Meals. 60 tablet 11   • DULoxetine (CYMBALTA) 60 MG capsule Take 60 mg by mouth Daily.     • fenofibrate (TRICOR) 145 MG tablet Take 145 mg by mouth Daily.     • gabapentin (NEURONTIN) 600 MG tablet Take 600 mg by mouth 3 (Three) Times a Day.     • hydrALAZINE (APRESOLINE) 25 MG tablet Take 25 mg by mouth 3 (Three) Times a Day.     • HYDROcodone-acetaminophen (NORCO)  MG per tablet TK 1 T PO Q 6 H PRN FOR PAIN FOR 30 DAYS  0   • insulin aspart (novoLOG FLEXPEN) 100 UNIT/ML solution pen-injector sc pen Inject  under the skin 3 (Three) Times a Day With Meals.     • Insulin Glargine (LANTUS SOLOSTAR) 100 UNIT/ML injection pen Inject  under the skin.     • insulin glargine (LANTUS) 100 UNIT/ML injection Inject  under the skin Daily.     • isosorbide mononitrate (IMDUR) 30 MG 24 hr tablet Take 30 mg by mouth Daily.     • lisinopril (PRINIVIL,ZESTRIL) 40 MG tablet Take 40 mg by mouth Daily.     • metFORMIN (GLUCOPHAGE) 1000 MG tablet Take 1,000 mg by mouth 2 (Two) Times a Day With Meals.     • spironolactone (ALDACTONE) 25 MG tablet Take 25 mg by mouth Daily.     • Umeclidinium Bromide (INCRUSE ELLIPTA) 62.5 MCG/INH aerosol powder  Inhale.       No current facility-administered medications for this visit.   "        OBJECTIVE    Ht 195.6 cm (77\")   Wt (!) 160 kg (352 lb 11.8 oz)   BMI 41.83 kg/m²       Review of Systems   Constitutional: Negative.    HENT: Negative.    Eyes: Negative.    Respiratory: Positive for wheezing.    Cardiovascular: Positive for leg swelling.   Gastrointestinal: Negative.    Endocrine: Positive for heat intolerance.   Genitourinary: Negative.    Musculoskeletal: Positive for back pain and joint swelling.        Foot pain, ankle pain   Skin: Positive for wound.   Allergic/Immunologic: Negative.    Neurological: Positive for dizziness.   Hematological: Negative.    Psychiatric/Behavioral: The patient is nervous/anxious.          Physical Exam   Constitutional: she appears well-developed and well-nourished.   HEENT: Normocephalic. Atraumatic  CV: No tenderness. RRR  Resp: Non-labored respiration. No wheezes.   Lymphatic: No lymphadenopathy.   Psychiatric: she has a normal mood and affect. her   behavior is normal.      Lower Extremity Exam:  Vascular: DP/PT pulses palpable  Negative hair growth.   Minimal  edema  Neuro: Protective sensation absent, b/l.  DTRs intact  Integument: No lesions.  1.4 x 0.6 x 0.2 cm FT ulcer to plantar left sub 1st MTPJ with overlying bulla formation.  No streaking cellulitis noted.  Periwound HPK. Fibrous base. No PTB. No surrounding erythema. No drainage.  Web spaces c/d/i  Musculoskeletal: LE muscle strength 5/5.   Gait normal  Mild hammertoe deformity toes 2-5 b/l.  Ankle ROM decreased without pain or crepitus  STJ ROM decreased    Right foot wound debridement:  Risks and benefits discussed.  Left hallux ulcer/bulla debrided of overlying desiccated skin and devitalized tissue with 15 blade and tissue nipper to level of subq  Pressure hemostasis obtained  Abx ointment and dsd applied.            ASSESSMENT AND PLAN    Tayo was seen today for toe wound.    Diagnoses and all orders for this visit:    Toe blister without infection, left, initial encounter    Skin " ulcer of right foot with fat layer exposed    Diabetic polyneuropathy associated with type 2 diabetes mellitus      -Comprehensive DM foot exam performed. Pt educated on importance of tight glucose control and daily foot checks.   -Debridement of left hallux bulla as above revealed underlying full-thickness ulceration to distal tuft.  No obvious probe to bone or further underlying fluctuance was noted.  -Medihoney and a dry dressing was applied to this wound.  Advised the patient to keep this area clean and bandaged.  Continue to follow up in wound care center.  -Recheck as needed          This document has been electronically signed by Luc Leon DPM on April 11, 2018 3:15 PM     EMR Dragon/Transcription disclaimer:   Much of this encounter note is an electronic transcription/translation of spoken language to printed text. The electronic translation of spoken language may permit erroneous, or at times, nonsensical words or phrases to be inadvertently transcribed; Although I have reviewed the note for such errors, some may still exist.    Luc Leon DPM  4/11/2018  3:15 PM

## 2018-04-06 NOTE — PROGRESS NOTES
3/26/2018    Steven R Midkiff  1960    Chief Complaint:  PVD    HPI:      PCP:  Jhony Flores MD, Nasir LIZ  [facility]     57 y.o. male with HTN(stable, increased risk stroke), hyperlipidemia(stable, increased risk cardiovascular events), COPD(stable, increased risk pulmonary complications), DM2(stable, increased risk cardiovascular events), Morbid Obesity(uncontrolled, BMI 45, increased risk cardiovascular events), PVD(new, increased risk cardiovascular events).  smokes 1/2 PPD.  small blister LEFT 1st toe.  Wound on bottom RIGHT foot with injury, steeped on nail.  Moderate legs hurt all the time x 2 years.  Eases up some with rest.  Venous stasis changes.  Moderate bilateral knee pain.  .  No TIA stroke amaurosis.  No MI claudication. No other associated signs, symptoms or modifying factors.     5/2013 Carotid Duplex:  YUKO 0-49% antegrade vert.  LICA 0-49% antegrade vert.  4/2014 ECG:  NSR 65, QTc 422, LBBB  5/2014 Nuclear myocardial stress test:  EF 53%, no ischemia.  8/7/2017 ANDREA:  RIGHT .71 tri/biphasic.  LEFT 1.0 triphasic.  3/26/2018 ANDREA:  RIGHT .68 tri/biphasic.  LEFT .93 tri/biphasic.    The following portions of the patient's history were reviewed and updated as appropriate: allergies, current medications, past family history, past medical history, past social history, past surgical history and problem list.  Recent images independently reviewed.  Available laboratory values reviewed.    PMH:  Past Medical History:   Diagnosis Date   • Cerebrovascular accident    • COPD (chronic obstructive pulmonary disease)    • Diabetes mellitus    • History of echocardiogram     Technically suboptimally study due to morbid obesity. Mild CLVH with early diastolic dysfunction. EF 60-65%. AV mildly sclerotic. Mitral and tricuspid valves appear to be grossly normal.   • Hyperlipidemia    • Hypertension    • Obesity     morbid   • Peripheral vascular disease        ALLERGIES:  No Known  Allergies      MEDICATIONS:    Current Outpatient Prescriptions:   •  albuterol (PROVENTIL HFA;VENTOLIN HFA) 108 (90 BASE) MCG/ACT inhaler, Inhale 2 puffs Every 4 (Four) Hours As Needed for Wheezing., Disp: , Rfl:   •  amLODIPine (NORVASC) 10 MG tablet, Take 10 mg by mouth Daily., Disp: , Rfl:   •  aspirin 81 MG EC tablet, Take 81 mg by mouth Daily., Disp: , Rfl:   •  atorvastatin (LIPITOR) 10 MG tablet, Take 10 mg by mouth Daily., Disp: , Rfl:   •  budesonide-formoterol (SYMBICORT) 160-4.5 MCG/ACT inhaler, Inhale 2 puffs 2 (Two) Times a Day., Disp: , Rfl:   •  carvedilol (COREG) 12.5 MG tablet, Take 12.5 mg by mouth 2 (Two) Times a Day With Meals., Disp: , Rfl:   •  cilostazol (PLETAL) 100 MG tablet, Take 1 tablet by mouth 2 (Two) Times a Day Before Meals., Disp: 60 tablet, Rfl: 11  •  DULoxetine (CYMBALTA) 60 MG capsule, Take 60 mg by mouth Daily., Disp: , Rfl:   •  fenofibrate (TRICOR) 145 MG tablet, Take 145 mg by mouth Daily., Disp: , Rfl:   •  gabapentin (NEURONTIN) 600 MG tablet, Take 600 mg by mouth 3 (Three) Times a Day., Disp: , Rfl:   •  hydrALAZINE (APRESOLINE) 25 MG tablet, Take 25 mg by mouth 3 (Three) Times a Day., Disp: , Rfl:   •  HYDROcodone-acetaminophen (NORCO)  MG per tablet, TK 1 T PO Q 6 H PRN FOR PAIN FOR 30 DAYS, Disp: , Rfl: 0  •  insulin aspart (novoLOG FLEXPEN) 100 UNIT/ML solution pen-injector sc pen, Inject  under the skin 3 (Three) Times a Day With Meals., Disp: , Rfl:   •  Insulin Glargine (LANTUS SOLOSTAR) 100 UNIT/ML injection pen, Inject  under the skin., Disp: , Rfl:   •  insulin glargine (LANTUS) 100 UNIT/ML injection, Inject  under the skin Daily., Disp: , Rfl:   •  isosorbide mononitrate (IMDUR) 30 MG 24 hr tablet, Take 30 mg by mouth Daily., Disp: , Rfl:   •  lisinopril (PRINIVIL,ZESTRIL) 40 MG tablet, Take 40 mg by mouth Daily., Disp: , Rfl:   •  metFORMIN (GLUCOPHAGE) 1000 MG tablet, Take 1,000 mg by mouth 2 (Two) Times a Day With Meals., Disp: , Rfl:   •   spironolactone (ALDACTONE) 25 MG tablet, Take 25 mg by mouth Daily., Disp: , Rfl:   •  Umeclidinium Bromide (INCRUSE ELLIPTA) 62.5 MCG/INH aerosol powder , Inhale., Disp: , Rfl:     Review of Systems   Review of Systems   Constitution: Positive for weight loss. Negative for weakness and malaise/fatigue.   Cardiovascular: Positive for dyspnea on exertion and leg swelling. Negative for chest pain and claudication.   Respiratory: Positive for shortness of breath and sleep disturbances due to breathing. Negative for cough.    Skin: Positive for poor wound healing. Negative for color change.   Musculoskeletal: Positive for arthritis, back pain and neck pain.   Neurological: Negative for dizziness and numbness.   Psychiatric/Behavioral: Positive for depression.       Physical Exam   Physical Exam   Constitutional: He is oriented to person, place, and time. He is active and cooperative. He does not appear ill. No distress.   HENT:   Right Ear: Hearing normal.   Left Ear: Hearing normal.   Nose: No nasal deformity. No epistaxis.   Mouth/Throat: He does not have dentures. Normal dentition.   Cardiovascular: Normal rate and regular rhythm.    No murmur heard.  Pulses:       Carotid pulses are 2+ on the right side, and 2+ on the left side.       Radial pulses are 2+ on the right side, and 2+ on the left side.        Dorsalis pedis pulses are 1+ on the right side, and 2+ on the left side.        Posterior tibial pulses are 1+ on the right side, and 2+ on the left side.   Pulmonary/Chest: Effort normal and breath sounds normal.   Abdominal: Soft. He exhibits no distension and no mass. There is no tenderness.   Musculoskeletal: He exhibits no deformity.   Gait normal.    Neurological: He is alert and oriented to person, place, and time. He has normal strength.   Skin: Skin is warm and dry. No cyanosis or erythema. No pallor.   No venous staining  3mm friction blister LEFT 1st toe  Puncture wound bottom RIGHT foot.   Psychiatric:  He has a normal mood and affect. His speech is normal. Judgment and thought content normal.     Results for MIDKIFF, STEVEN R (MRN [id number]) as of 4/6/2018 13:47   Ref. Range 1/24/2018 12:53   Creatinine Latest Ref Range: 0.70 - 1.30 mg/dL 0.55 (L)   BUN Latest Ref Range: 7 - 21 mg/dL 7     ASSESSMENT:  Alissa was seen today for peripheral vascular disease.    Diagnoses and all orders for this visit:    Peripheral vascular disease  -     Doppler Ankle Brachial Index Single Level CAR; Future    Essential hypertension    Mixed hyperlipidemia    Cerebrovascular accident (CVA), unspecified mechanism    Atherosclerosis of artery of extremity with intermittent claudication    Panlobular emphysema    Class 3 obesity due to excess calories with serious comorbidity and body mass index (BMI) of 40.0 to 44.9 in adult    PLAN:  Detailed discussion with Steven R Midkiff regarding situation and options.  Moderate PVD with claudication.  Multiple risk factors with severe comorbidities.  No intervention indicated at this time.  Will follow with interval imaging.  Risks, benefits discussed.  Understands and wishes to proceed with plan.     Return in 1 year with ANDREA  Continue local wound care, wound center.    Return after above studies complete  Smoking cessation advised and assistance options offered including behavioral counseling ([employer] [facility]ess[employer] [facility]), Nicotine replacement therapy (patches or gum), pharmacologic therapy (Chantix, Wellbutrin). patient understands that continued use of tobacco products increases her risk of MI, CVA, PAD, cancer; counseling for 3-5min.  Obesity Class 3. Options for weight management, heart healthy diet, exercise programs, and associated health risks of obesity discussed.  Recommended regular physical activity, progressive walking program.  Continue current medications as directed.    Thank you for the opportunity to participate in this patient's care.    Copy to primary care  provider.    MEDARDO Dragon/Transcription disclaimer:   Much of this encounter note is an electronic transcription/translation of spoken language to printed text. The electronic translation of spoken language may permit erroneous, or at times, nonsensical words or phrases to be inadvertently transcribed; Although I have reviewed the note for such errors, some may still exist.

## 2018-12-17 NOTE — PROGRESS NOTES
Steven Ross Midkiff  1960  58 y.o. male   PCP: Patient states he has a new provider and doesn't know the name.  HgA1C 10.4 6/20/18      Patient presents today with complaint of callus and has an open wound. He states he has had this for 6 months and has been being treated by wound care but they stopped seeing him.   12/17/2018    Chief Complaint   Patient presents with   • Right Foot - Pain           History of Present Illness    Steven Ross Midkiff is a 58 y.o. male previously seen for evaluation of a possible abscesses left foot as he was being treated for a wound by the wound care center.  He states that that wound healed up and he stopped seeing the wound care center couple of months ago.  He has developed significant callus formation and likely another wound to the right foot.  He continues to have issues with foot hygiene and offloading shoe gear.  He states that he presents today at the instruction of his sister.    Past Medical History:   Diagnosis Date   • Cerebrovascular accident (CMS/HCC)    • COPD (chronic obstructive pulmonary disease) (CMS/HCC)    • Diabetes mellitus (CMS/HCC)    • History of echocardiogram     Technically suboptimally study due to morbid obesity. Mild CLVH with early diastolic dysfunction. EF 60-65%. AV mildly sclerotic. Mitral and tricuspid valves appear to be grossly normal.   • Hyperlipidemia    • Hypertension    • Obesity     morbid   • Peripheral vascular disease (CMS/HCC)          Past Surgical History:   Procedure Laterality Date   • OTHER SURGICAL HISTORY  08/15/2014    DEBRIDEMENT SKIN/TISSUE 91413 (11         Family History   Problem Relation Age of Onset   • Heart disease Father          Social History     Socioeconomic History   • Marital status:      Spouse name: Not on file   • Number of children: Not on file   • Years of education: Not on file   • Highest education level: Not on file   Social Needs   • Financial resource strain: Not on file   • Food  insecurity - worry: Not on file   • Food insecurity - inability: Not on file   • Transportation needs - medical: Not on file   • Transportation needs - non-medical: Not on file   Occupational History   • Not on file   Tobacco Use   • Smoking status: Current Every Day Smoker     Types: Cigarettes   • Smokeless tobacco: Never Used   Substance and Sexual Activity   • Alcohol use: No   • Drug use: No   • Sexual activity: Not on file   Other Topics Concern   • Not on file   Social History Narrative   • Not on file         Current Outpatient Medications   Medication Sig Dispense Refill   • albuterol (PROVENTIL HFA;VENTOLIN HFA) 108 (90 BASE) MCG/ACT inhaler Inhale 2 puffs Every 4 (Four) Hours As Needed for Wheezing.     • amLODIPine (NORVASC) 10 MG tablet Take 10 mg by mouth Daily.     • aspirin 81 MG EC tablet Take 81 mg by mouth Daily.     • atorvastatin (LIPITOR) 10 MG tablet Take 10 mg by mouth Daily.     • budesonide-formoterol (SYMBICORT) 160-4.5 MCG/ACT inhaler Inhale 2 puffs 2 (Two) Times a Day.     • carvedilol (COREG) 12.5 MG tablet Take 12.5 mg by mouth 2 (Two) Times a Day With Meals.     • DULoxetine (CYMBALTA) 60 MG capsule Take 60 mg by mouth Daily.     • fenofibrate (TRICOR) 145 MG tablet Take 145 mg by mouth Daily.     • gabapentin (NEURONTIN) 600 MG tablet Take 600 mg by mouth 3 (Three) Times a Day.     • hydrALAZINE (APRESOLINE) 25 MG tablet Take 25 mg by mouth 3 (Three) Times a Day.     • HYDROcodone-acetaminophen (NORCO)  MG per tablet TK 1 T PO Q 6 H PRN FOR PAIN FOR 30 DAYS  0   • insulin aspart (novoLOG FLEXPEN) 100 UNIT/ML solution pen-injector sc pen Inject  under the skin 3 (Three) Times a Day With Meals.     • Insulin Glargine (LANTUS SOLOSTAR) 100 UNIT/ML injection pen Inject  under the skin.     • insulin glargine (LANTUS) 100 UNIT/ML injection Inject  under the skin Daily.     • isosorbide mononitrate (IMDUR) 30 MG 24 hr tablet Take 30 mg by mouth Daily.     • lisinopril  "(PRINIVIL,ZESTRIL) 40 MG tablet Take 40 mg by mouth Daily.     • metFORMIN (GLUCOPHAGE) 1000 MG tablet Take 1,000 mg by mouth 2 (Two) Times a Day With Meals.     • spironolactone (ALDACTONE) 25 MG tablet Take 25 mg by mouth Daily.     • Umeclidinium Bromide (INCRUSE ELLIPTA) 62.5 MCG/INH aerosol powder  Inhale.       No current facility-administered medications for this visit.          OBJECTIVE    Pulse 85   Ht 195.6 cm (77.01\")   Wt (!) 160 kg (352 lb)   SpO2 96%   BMI 41.73 kg/m²       Review of Systems   Constitutional: Negative.    HENT: Negative.    Eyes: Negative.    Respiratory: Positive for wheezing.    Cardiovascular: Positive for leg swelling.   Gastrointestinal: Negative.    Endocrine: Positive for heat intolerance.   Genitourinary: Negative.    Musculoskeletal: Positive for back pain and joint swelling.        Foot pain, ankle pain   Skin: Positive for wound.   Allergic/Immunologic: Negative.    Neurological: Positive for dizziness.   Hematological: Negative.    Psychiatric/Behavioral: The patient is nervous/anxious.          Physical Exam   Constitutional: she appears well-developed and well-nourished.   HEENT: Normocephalic. Atraumatic  CV: No tenderness. RRR  Resp: Non-labored respiration. No wheezes.   Lymphatic: No lymphadenopathy.   Psychiatric: she has a normal mood and affect. her   behavior is normal.      Lower Extremity Exam:  Vascular: DP/PT pulses palpable  Negative hair growth.   Minimal  edema  Neuro: Protective sensation absent, b/l.  DTRs intact  Integument: No lesions.  Right foot ulcerations:  Sub 1st MTPJ - 3 x 1 x 0.4 cm. Exuberrant surrounding HPK. No PTB. No drainage. Fibrogranular base  Sub 5th MTPJ - 1.5 x 4 x 0.2. No PTB. No drainage.   Web spaces c/d/i  Musculoskeletal: LE muscle strength 5/5.   Gait normal  Mild hammertoe deformity toes 2-5 b/l.  Ankle ROM decreased without pain or crepitus  STJ ROM decreased    Right foot wound debridement:  Risks and benefits " discussed.  Left hallux, sub 5th ulcer debrided of overlying desiccated skin and devitalized tissue with 15 blade and tissue nipper to level of subq  Pressure hemostasis obtained  Abx ointment and dsd applied.            ASSESSMENT AND PLAN    Tayo was seen today for pain.    Diagnoses and all orders for this visit:    Skin ulcer of right foot with fat layer exposed (CMS/HCC)    Diabetic polyneuropathy associated with type 2 diabetes mellitus (CMS/HCC)      -Comprehensive DM foot exam performed. Pt educated on importance of tight glucose control and daily foot checks.   -Wound debridement as above  -Stressed importance of offloading. Patient not agreeable to TCC at this time. Placed into offloading shoe  -Dressing applied. Home dressing supplies ordered  -Recheck 1 week          This document has been electronically signed by Luc Leon DPM on December 25, 2018 12:48 PM     EMR Dragon/Transcription disclaimer:   Much of this encounter note is an electronic transcription/translation of spoken language to printed text. The electronic translation of spoken language may permit erroneous, or at times, nonsensical words or phrases to be inadvertently transcribed; Although I have reviewed the note for such errors, some may still exist.    Luc Leon DPM  12/25/2018  12:48 PM

## 2018-12-27 NOTE — PROGRESS NOTES
Steven Ross Midkiff  1960  58 y.o. male   PCP: Nasir Flores, APRN  10/4/18  HgA1C 10.4 6/20/18      Patient presents today with complaint of callus and has an open wound. He states he has had this for 6 months and has been being treated by wound care but they stopped seeing him.     12/27/2018  Chief Complaint   Patient presents with   • Right Foot - Follow-up, Wound Check           History of Present Illness    Steven Ross Midkiff is a 58 y.o. male who presents for follow-up of right foot ulcerations.    Past Medical History:   Diagnosis Date   • Cerebrovascular accident (CMS/HCC)    • COPD (chronic obstructive pulmonary disease) (CMS/HCC)    • Diabetes mellitus (CMS/HCC)    • History of echocardiogram     Technically suboptimally study due to morbid obesity. Mild CLVH with early diastolic dysfunction. EF 60-65%. AV mildly sclerotic. Mitral and tricuspid valves appear to be grossly normal.   • Hyperlipidemia    • Hypertension    • Obesity     morbid   • Peripheral vascular disease (CMS/HCC)          Past Surgical History:   Procedure Laterality Date   • OTHER SURGICAL HISTORY  08/15/2014    DEBRIDEMENT SKIN/TISSUE 37388 (11         Family History   Problem Relation Age of Onset   • Heart disease Father          Social History     Socioeconomic History   • Marital status:      Spouse name: Not on file   • Number of children: Not on file   • Years of education: Not on file   • Highest education level: Not on file   Social Needs   • Financial resource strain: Not on file   • Food insecurity - worry: Not on file   • Food insecurity - inability: Not on file   • Transportation needs - medical: Not on file   • Transportation needs - non-medical: Not on file   Occupational History   • Not on file   Tobacco Use   • Smoking status: Current Every Day Smoker     Types: Cigarettes   • Smokeless tobacco: Never Used   Substance and Sexual Activity   • Alcohol use: No   • Drug use: No   • Sexual activity: Not on file  "  Other Topics Concern   • Not on file   Social History Narrative   • Not on file         Current Outpatient Medications   Medication Sig Dispense Refill   • albuterol (PROVENTIL HFA;VENTOLIN HFA) 108 (90 BASE) MCG/ACT inhaler Inhale 2 puffs Every 4 (Four) Hours As Needed for Wheezing.     • amLODIPine (NORVASC) 10 MG tablet Take 10 mg by mouth Daily.     • aspirin 81 MG EC tablet Take 81 mg by mouth Daily.     • atorvastatin (LIPITOR) 10 MG tablet Take 10 mg by mouth Daily.     • budesonide-formoterol (SYMBICORT) 160-4.5 MCG/ACT inhaler Inhale 2 puffs 2 (Two) Times a Day.     • carvedilol (COREG) 12.5 MG tablet Take 12.5 mg by mouth 2 (Two) Times a Day With Meals.     • DULoxetine (CYMBALTA) 60 MG capsule Take 60 mg by mouth Daily.     • fenofibrate (TRICOR) 145 MG tablet Take 145 mg by mouth Daily.     • gabapentin (NEURONTIN) 600 MG tablet Take 600 mg by mouth 3 (Three) Times a Day.     • hydrALAZINE (APRESOLINE) 25 MG tablet Take 25 mg by mouth 3 (Three) Times a Day.     • HYDROcodone-acetaminophen (NORCO)  MG per tablet TK 1 T PO Q 6 H PRN FOR PAIN FOR 30 DAYS  0   • insulin aspart (novoLOG FLEXPEN) 100 UNIT/ML solution pen-injector sc pen Inject  under the skin 3 (Three) Times a Day With Meals.     • Insulin Glargine (LANTUS SOLOSTAR) 100 UNIT/ML injection pen Inject  under the skin.     • insulin glargine (LANTUS) 100 UNIT/ML injection Inject  under the skin Daily.     • isosorbide mononitrate (IMDUR) 30 MG 24 hr tablet Take 30 mg by mouth Daily.     • lisinopril (PRINIVIL,ZESTRIL) 40 MG tablet Take 40 mg by mouth Daily.     • metFORMIN (GLUCOPHAGE) 1000 MG tablet Take 1,000 mg by mouth 2 (Two) Times a Day With Meals.     • spironolactone (ALDACTONE) 25 MG tablet Take 25 mg by mouth Daily.     • Umeclidinium Bromide (INCRUSE ELLIPTA) 62.5 MCG/INH aerosol powder  Inhale.       No current facility-administered medications for this visit.          OBJECTIVE    Ht 195.6 cm (77.01\")   Wt (!) 160 kg (352 lb) "   BMI 41.73 kg/m²       Review of Systems   Constitutional: Negative.    HENT: Negative.    Eyes: Negative.    Respiratory: Positive for wheezing.    Cardiovascular: Positive for leg swelling.   Gastrointestinal: Negative.    Endocrine: Positive for heat intolerance.   Genitourinary: Negative.    Musculoskeletal: Positive for back pain and joint swelling.        Foot pain, ankle pain   Skin: Positive for wound.   Allergic/Immunologic: Negative.    Neurological: Positive for dizziness.   Hematological: Negative.    Psychiatric/Behavioral: The patient is nervous/anxious.          Physical Exam   Constitutional: she appears well-developed and well-nourished.   HEENT: Normocephalic. Atraumatic  CV: No tenderness. RRR  Resp: Non-labored respiration. No wheezes.   Lymphatic: No lymphadenopathy.   Psychiatric: she has a normal mood and affect. her   behavior is normal.      Lower Extremity Exam:  Vascular: DP/PT pulses palpable  Negative hair growth.   Minimal  edema  Neuro: Protective sensation absent, b/l.  DTRs intact  Integument: No lesions.  Right foot ulcerations:  Sub 1st MTPJ - 1.5 x 1 x 0.4 cm. Exuberrant surrounding HPK. No PTB. No drainage. Fibrogranular base  Sub 5th MTPJ - 1.7 x 3.6 x 0.2. No PTB. No drainage.   Web spaces c/d/i  Musculoskeletal: LE muscle strength 5/5.   Gait normal  Mild hammertoe deformity toes 2-5 b/l.  Ankle ROM decreased without pain or crepitus  STJ ROM decreased    Right foot wound debridement:  Risks and benefits discussed.  Left hallux, sub 5th ulcer debrided of overlying desiccated skin and devitalized tissue with 15 blade and tissue nipper to level of subq  Pressure hemostasis obtained  Abx ointment and dsd applied.            ASSESSMENT AND PLAN    Tayo was seen today for follow-up and wound check.    Diagnoses and all orders for this visit:    Skin ulcer of right foot with fat layer exposed (CMS/HCC)    Diabetic polyneuropathy associated with type 2 diabetes mellitus  (CMS/[facility])      -Comprehensive DM foot exam performed. Pt educated on importance of tight glucose control and daily foot checks.   -Wound debridement as above  -Stressed importance of offloading. Patient not agreeable to TCC at this time. Placed into offloading CAM boot  -Dressing applied. Home dressing supplies ordered  -Recheck 1 week          This document has been electronically signed by Luc Leon DPM on December 31, 2018 11:54 AM     EMR Dragon/Transcription disclaimer:   Much of this encounter note is an electronic transcription/translation of spoken language to printed text. The electronic translation of spoken language may permit erroneous, or at times, nonsensical words or phrases to be inadvertently transcribed; Although I have reviewed the note for such errors, some may still exist.    Luc Leon DPM  12/31/2018  11:54 AM

## 2019-01-01 ENCOUNTER — OFFICE VISIT (OUTPATIENT)
Dept: PODIATRY | Facility: CLINIC | Age: 59
End: 2019-01-01

## 2019-01-01 VITALS — HEART RATE: 85 BPM | BODY MASS INDEX: 37.19 KG/M2 | OXYGEN SATURATION: 99 % | WEIGHT: 315 LBS | HEIGHT: 77 IN

## 2019-01-01 VITALS — OXYGEN SATURATION: 93 % | WEIGHT: 315 LBS | BODY MASS INDEX: 37.19 KG/M2 | HEART RATE: 93 BPM | HEIGHT: 77 IN

## 2019-01-01 VITALS — WEIGHT: 315 LBS | OXYGEN SATURATION: 96 % | HEART RATE: 86 BPM | HEIGHT: 77 IN | BODY MASS INDEX: 37.19 KG/M2

## 2019-01-01 DIAGNOSIS — E11.42 DIABETIC POLYNEUROPATHY ASSOCIATED WITH TYPE 2 DIABETES MELLITUS (HCC): ICD-10-CM

## 2019-01-01 DIAGNOSIS — L97.512 SKIN ULCER OF RIGHT FOOT WITH FAT LAYER EXPOSED (HCC): Primary | ICD-10-CM

## 2019-01-01 DIAGNOSIS — L97.512 SKIN ULCER OF TOE OF RIGHT FOOT WITH FAT LAYER EXPOSED (HCC): ICD-10-CM

## 2019-01-01 PROCEDURE — 11042 DBRDMT SUBQ TIS 1ST 20SQCM/<: CPT | Performed by: PODIATRIST

## 2019-01-01 RX ORDER — ESCITALOPRAM OXALATE 20 MG/1
20 TABLET ORAL DAILY
Refills: 1 | COMMUNITY
Start: 2019-01-01

## 2019-01-04 NOTE — PROGRESS NOTES
Steven Ross Midkiff  1960  58 y.o. male   PCP: Nasir Flores, APRN  10/4/18  HgA1C 10.4 6/20/18      Patient presents today with complaint of callus and has an open wound. He states his pain is 8/10.     01/04/2019    Chief Complaint   Patient presents with   • Right Foot - Wound Check           History of Present Illness    Steven Ross Midkiff is a 58 y.o. male who presents for follow-up of right foot ulcerations. Ambulating in offloading boot on right. Has not been able to receive home dressing supplies yet due to an active home health order. However states home health has not been performing dressing changes either.    Past Medical History:   Diagnosis Date   • Cerebrovascular accident (CMS/HCC)    • COPD (chronic obstructive pulmonary disease) (CMS/HCC)    • Diabetes mellitus (CMS/HCC)    • History of echocardiogram     Technically suboptimally study due to morbid obesity. Mild CLVH with early diastolic dysfunction. EF 60-65%. AV mildly sclerotic. Mitral and tricuspid valves appear to be grossly normal.   • Hyperlipidemia    • Hypertension    • Obesity     morbid   • Peripheral vascular disease (CMS/HCC)          Past Surgical History:   Procedure Laterality Date   • OTHER SURGICAL HISTORY  08/15/2014    DEBRIDEMENT SKIN/TISSUE 39971 (11         Family History   Problem Relation Age of Onset   • Heart disease Father          Social History     Socioeconomic History   • Marital status:      Spouse name: Not on file   • Number of children: Not on file   • Years of education: Not on file   • Highest education level: Not on file   Social Needs   • Financial resource strain: Not on file   • Food insecurity - worry: Not on file   • Food insecurity - inability: Not on file   • Transportation needs - medical: Not on file   • Transportation needs - non-medical: Not on file   Occupational History   • Not on file   Tobacco Use   • Smoking status: Current Every Day Smoker     Types: Cigarettes   • Smokeless  tobacco: Never Used   Substance and Sexual Activity   • Alcohol use: No   • Drug use: No   • Sexual activity: Not on file   Other Topics Concern   • Not on file   Social History Narrative   • Not on file         Current Outpatient Medications   Medication Sig Dispense Refill   • albuterol (PROVENTIL HFA;VENTOLIN HFA) 108 (90 BASE) MCG/ACT inhaler Inhale 2 puffs Every 4 (Four) Hours As Needed for Wheezing.     • amLODIPine (NORVASC) 10 MG tablet Take 10 mg by mouth Daily.     • aspirin 81 MG EC tablet Take 81 mg by mouth Daily.     • atorvastatin (LIPITOR) 10 MG tablet Take 10 mg by mouth Daily.     • budesonide-formoterol (SYMBICORT) 160-4.5 MCG/ACT inhaler Inhale 2 puffs 2 (Two) Times a Day.     • carvedilol (COREG) 12.5 MG tablet Take 12.5 mg by mouth 2 (Two) Times a Day With Meals.     • DULoxetine (CYMBALTA) 60 MG capsule Take 60 mg by mouth Daily.     • fenofibrate (TRICOR) 145 MG tablet Take 145 mg by mouth Daily.     • gabapentin (NEURONTIN) 600 MG tablet Take 600 mg by mouth 3 (Three) Times a Day.     • hydrALAZINE (APRESOLINE) 25 MG tablet Take 25 mg by mouth 3 (Three) Times a Day.     • HYDROcodone-acetaminophen (NORCO)  MG per tablet TK 1 T PO Q 6 H PRN FOR PAIN FOR 30 DAYS  0   • insulin aspart (novoLOG FLEXPEN) 100 UNIT/ML solution pen-injector sc pen Inject  under the skin 3 (Three) Times a Day With Meals.     • Insulin Glargine (LANTUS SOLOSTAR) 100 UNIT/ML injection pen Inject  under the skin.     • insulin glargine (LANTUS) 100 UNIT/ML injection Inject  under the skin Daily.     • isosorbide mononitrate (IMDUR) 30 MG 24 hr tablet Take 30 mg by mouth Daily.     • lisinopril (PRINIVIL,ZESTRIL) 40 MG tablet Take 40 mg by mouth Daily.     • metFORMIN (GLUCOPHAGE) 1000 MG tablet Take 1,000 mg by mouth 2 (Two) Times a Day With Meals.     • spironolactone (ALDACTONE) 25 MG tablet Take 25 mg by mouth Daily.     • Umeclidinium Bromide (INCRUSE ELLIPTA) 62.5 MCG/INH aerosol powder  Inhale.       No  "current facility-administered medications for this visit.          OBJECTIVE    Pulse 85   Ht 195.6 cm (77.01\")   Wt (!) 160 kg (352 lb)   SpO2 99%   BMI 41.73 kg/m²       Review of Systems   Constitutional: Negative.    HENT: Negative.    Eyes: Negative.    Respiratory: Positive for wheezing.    Cardiovascular: Positive for leg swelling.   Gastrointestinal: Negative.    Endocrine: Positive for heat intolerance.   Genitourinary: Negative.    Musculoskeletal: Positive for back pain and joint swelling.        Foot pain, ankle pain   Skin: Positive for wound.   Allergic/Immunologic: Negative.    Neurological: Positive for dizziness.   Hematological: Negative.    Psychiatric/Behavioral: The patient is nervous/anxious.          Physical Exam   Constitutional: she appears well-developed and well-nourished.   HEENT: Normocephalic. Atraumatic  CV: No tenderness. RRR  Resp: Non-labored respiration. No wheezes.   Lymphatic: No lymphadenopathy.   Psychiatric: she has a normal mood and affect. her   behavior is normal.      Lower Extremity Exam:  Vascular: DP/PT pulses palpable  Negative hair growth.   Minimal  edema  Neuro: Protective sensation absent, b/l.  DTRs intact  Integument: No lesions.  Right foot ulcerations:  Sub 1st MTPJ - predominantly healed. Exuberrant surrounding HPK. No PTB. No drainage.  Sub 5th MTPJ - 1.7 x 3.0 x 0.2. No PTB. No drainage.   Web spaces c/d/i  Musculoskeletal: LE muscle strength 5/5.   Gait normal  Mild hammertoe deformity toes 2-5 b/l.  Ankle ROM decreased without pain or crepitus  STJ ROM decreased    Right foot wound debridement:  Risks and benefits discussed.  Left hallux, sub 5th ulcer debrided of overlying desiccated skin and devitalized tissue with 15 blade and tissue nipper to level of subq  Pressure hemostasis obtained  Abx ointment and dsd applied.            ASSESSMENT AND PLAN    Tayo was seen today for wound check.    Diagnoses and all orders for this visit:    Skin ulcer of " right foot with fat layer exposed (CMS/HCC)    Diabetic polyneuropathy associated with type 2 diabetes mellitus (CMS/HCC)      -Comprehensive DM foot exam performed. Pt educated on importance of tight glucose control and daily foot checks.   -Wound debridement as above  -Stressed importance of offloading. Patient not agreeable to TCC at this time. Continue offloading CAM boot  -Dressing applied. Home dressing supplies ordered  -Recheck 1 week          This document has been electronically signed by Luc Leon DPM on January 6, 2019 7:04 PM     EMR Dragon/Transcription disclaimer:   Much of this encounter note is an electronic transcription/translation of spoken language to printed text. The electronic translation of spoken language may permit erroneous, or at times, nonsensical words or phrases to be inadvertently transcribed; Although I have reviewed the note for such errors, some may still exist.    Luc Leon DPM  1/6/2019  7:04 PM

## 2019-01-10 NOTE — PROGRESS NOTES
Steven Ross Midkiff  1960  58 y.o. male   PCP: Nasir Flores, APRN  10/4/18  HgA1C 10.4 6/20/18      Patient presents today for wound recheck. He states his pain is 8/10.       01/10/2019    Chief Complaint   Patient presents with   • Right Foot - Wound Check           History of Present Illness    Steven Ross Midkiff is a 58 y.o. male who presents for follow-up of right foot ulcerations. Ambulating in offloading boot on right. Has not been able to receive home dressing supplies yet due to an active home health order. However states home health has not been performing dressing changes either.    Past Medical History:   Diagnosis Date   • Cerebrovascular accident (CMS/HCC)    • COPD (chronic obstructive pulmonary disease) (CMS/HCC)    • Diabetes mellitus (CMS/HCC)    • History of echocardiogram     Technically suboptimally study due to morbid obesity. Mild CLVH with early diastolic dysfunction. EF 60-65%. AV mildly sclerotic. Mitral and tricuspid valves appear to be grossly normal.   • Hyperlipidemia    • Hypertension    • Obesity     morbid   • Peripheral vascular disease (CMS/HCC)          Past Surgical History:   Procedure Laterality Date   • OTHER SURGICAL HISTORY  08/15/2014    DEBRIDEMENT SKIN/TISSUE 35134 (11         Family History   Problem Relation Age of Onset   • Heart disease Father          Social History     Socioeconomic History   • Marital status:      Spouse name: Not on file   • Number of children: Not on file   • Years of education: Not on file   • Highest education level: Not on file   Social Needs   • Financial resource strain: Not on file   • Food insecurity - worry: Not on file   • Food insecurity - inability: Not on file   • Transportation needs - medical: Not on file   • Transportation needs - non-medical: Not on file   Occupational History   • Not on file   Tobacco Use   • Smoking status: Current Every Day Smoker     Types: Cigarettes   • Smokeless tobacco: Never Used    Substance and Sexual Activity   • Alcohol use: No   • Drug use: No   • Sexual activity: Not on file   Other Topics Concern   • Not on file   Social History Narrative   • Not on file         Current Outpatient Medications   Medication Sig Dispense Refill   • albuterol (PROVENTIL HFA;VENTOLIN HFA) 108 (90 BASE) MCG/ACT inhaler Inhale 2 puffs Every 4 (Four) Hours As Needed for Wheezing.     • amLODIPine (NORVASC) 10 MG tablet Take 10 mg by mouth Daily.     • aspirin 81 MG EC tablet Take 81 mg by mouth Daily.     • atorvastatin (LIPITOR) 10 MG tablet Take 10 mg by mouth Daily.     • budesonide-formoterol (SYMBICORT) 160-4.5 MCG/ACT inhaler Inhale 2 puffs 2 (Two) Times a Day.     • carvedilol (COREG) 12.5 MG tablet Take 12.5 mg by mouth 2 (Two) Times a Day With Meals.     • DULoxetine (CYMBALTA) 60 MG capsule Take 60 mg by mouth Daily.     • escitalopram (LEXAPRO) 20 MG tablet Take 20 mg by mouth Daily.  1   • fenofibrate (TRICOR) 145 MG tablet Take 145 mg by mouth Daily.     • gabapentin (NEURONTIN) 600 MG tablet Take 600 mg by mouth 3 (Three) Times a Day.     • hydrALAZINE (APRESOLINE) 25 MG tablet Take 25 mg by mouth 3 (Three) Times a Day.     • HYDROcodone-acetaminophen (NORCO)  MG per tablet TK 1 T PO Q 6 H PRN FOR PAIN FOR 30 DAYS  0   • insulin aspart (novoLOG FLEXPEN) 100 UNIT/ML solution pen-injector sc pen Inject  under the skin 3 (Three) Times a Day With Meals.     • Insulin Glargine (LANTUS SOLOSTAR) 100 UNIT/ML injection pen Inject  under the skin.     • insulin glargine (LANTUS) 100 UNIT/ML injection Inject  under the skin Daily.     • isosorbide mononitrate (IMDUR) 30 MG 24 hr tablet Take 30 mg by mouth Daily.     • lisinopril (PRINIVIL,ZESTRIL) 40 MG tablet Take 40 mg by mouth Daily.     • metFORMIN (GLUCOPHAGE) 1000 MG tablet Take 1,000 mg by mouth 2 (Two) Times a Day With Meals.     • spironolactone (ALDACTONE) 25 MG tablet Take 25 mg by mouth Daily.     • Umeclidinium Bromide (INCRUSE ELLIPTA)  "62.5 MCG/INH aerosol powder  Inhale.       No current facility-administered medications for this visit.          OBJECTIVE    Pulse 86   Ht 195.6 cm (77.01\")   Wt (!) 160 kg (352 lb)   SpO2 96%   BMI 41.73 kg/m²       Review of Systems   Constitutional: Negative.    HENT: Negative.    Eyes: Negative.    Respiratory: Positive for wheezing.    Cardiovascular: Positive for leg swelling.   Gastrointestinal: Negative.    Endocrine: Positive for heat intolerance.   Genitourinary: Negative.    Musculoskeletal: Positive for back pain and joint swelling.        Foot pain, ankle pain   Skin: Positive for wound.   Allergic/Immunologic: Negative.    Neurological: Positive for dizziness.   Hematological: Negative.    Psychiatric/Behavioral: The patient is nervous/anxious.          Physical Exam   Constitutional: she appears well-developed and well-nourished.   HEENT: Normocephalic. Atraumatic  CV: No tenderness. RRR  Resp: Non-labored respiration. No wheezes.   Lymphatic: No lymphadenopathy.   Psychiatric: she has a normal mood and affect. her   behavior is normal.      Lower Extremity Exam:  Vascular: DP/PT pulses palpable  Negative hair growth.   Minimal  edema  Neuro: Protective sensation absent, b/l.  DTRs intact  Integument: No lesions.  Right foot ulcerations:  Sub 1st MTPJ - predominantly healed. Exuberrant surrounding HPK. No PTB. No drainage.  Sub 5th MTPJ - 1.8 x 2.5 x 0.2. No PTB. No drainage.   Web spaces c/d/i  Musculoskeletal: LE muscle strength 5/5.   Gait normal  Mild hammertoe deformity toes 2-5 b/l.  Ankle ROM decreased without pain or crepitus  STJ ROM decreased    Right foot wound debridement:  Risks and benefits discussed.  Left hallux, sub 5th ulcer debrided of overlying desiccated skin and devitalized tissue with 15 blade and tissue nipper to level of subq  Pressure hemostasis obtained  Abx ointment and dsd applied.            ASSESSMENT AND PLAN    Tayo was seen today for wound check.    Diagnoses " and all orders for this visit:    Skin ulcer of right foot with fat layer exposed (CMS/HCC)    Diabetic polyneuropathy associated with type 2 diabetes mellitus (CMS/HCC)      -Comprehensive DM foot exam performed. Pt educated on importance of tight glucose control and daily foot checks.   -Wound debridement as above  -Stressed importance of offloading. Patient not agreeable to TCC at this time. Continue offloading CAM boot  -Dressing applied. Home dressing supplies ordered  -Recheck 1 week          This document has been electronically signed by Luc Leon DPM on January 15, 2019 8:45 PM     EMR Dragon/Transcription disclaimer:   Much of this encounter note is an electronic transcription/translation of spoken language to printed text. The electronic translation of spoken language may permit erroneous, or at times, nonsensical words or phrases to be inadvertently transcribed; Although I have reviewed the note for such errors, some may still exist.    Luc Leon DPM  1/15/2019  8:45 PM

## 2019-01-17 NOTE — PROGRESS NOTES
Steven Ross Midkiff  1960  58 y.o. male   PCP: Nasir Flores, SHALONDA  10/4/18  HgA1C 10.4 6/20/18      Patient presents today for right foot wound check.   01/17/2019    Chief Complaint   Patient presents with   • Right Foot - Wound Check           History of Present Illness    Steven Ross Midkiff is a 58 y.o. male who presents for follow-up of right foot ulcerations. Ambulating in offloading boot on right. Has not been able to receive home dressing supplies yet due to an active home health order. However states home health has not been performing dressing changes either. Patient presents today with no dressing on his foot.    Past Medical History:   Diagnosis Date   • Cerebrovascular accident (CMS/HCC)    • COPD (chronic obstructive pulmonary disease) (CMS/HCC)    • Diabetes mellitus (CMS/HCC)    • History of echocardiogram     Technically suboptimally study due to morbid obesity. Mild CLVH with early diastolic dysfunction. EF 60-65%. AV mildly sclerotic. Mitral and tricuspid valves appear to be grossly normal.   • Hyperlipidemia    • Hypertension    • Obesity     morbid   • Peripheral vascular disease (CMS/HCC)          Past Surgical History:   Procedure Laterality Date   • OTHER SURGICAL HISTORY  08/15/2014    DEBRIDEMENT SKIN/TISSUE 38872 (11         Family History   Problem Relation Age of Onset   • Heart disease Father          Social History     Socioeconomic History   • Marital status:      Spouse name: Not on file   • Number of children: Not on file   • Years of education: Not on file   • Highest education level: Not on file   Social Needs   • Financial resource strain: Not on file   • Food insecurity - worry: Not on file   • Food insecurity - inability: Not on file   • Transportation needs - medical: Not on file   • Transportation needs - non-medical: Not on file   Occupational History   • Not on file   Tobacco Use   • Smoking status: Current Every Day Smoker     Types: Cigarettes   • Smokeless  tobacco: Never Used   Substance and Sexual Activity   • Alcohol use: No   • Drug use: No   • Sexual activity: Not on file   Other Topics Concern   • Not on file   Social History Narrative   • Not on file         Current Outpatient Medications   Medication Sig Dispense Refill   • albuterol (PROVENTIL HFA;VENTOLIN HFA) 108 (90 BASE) MCG/ACT inhaler Inhale 2 puffs Every 4 (Four) Hours As Needed for Wheezing.     • amLODIPine (NORVASC) 10 MG tablet Take 10 mg by mouth Daily.     • aspirin 81 MG EC tablet Take 81 mg by mouth Daily.     • atorvastatin (LIPITOR) 10 MG tablet Take 10 mg by mouth Daily.     • budesonide-formoterol (SYMBICORT) 160-4.5 MCG/ACT inhaler Inhale 2 puffs 2 (Two) Times a Day.     • carvedilol (COREG) 12.5 MG tablet Take 12.5 mg by mouth 2 (Two) Times a Day With Meals.     • DULoxetine (CYMBALTA) 60 MG capsule Take 60 mg by mouth Daily.     • escitalopram (LEXAPRO) 20 MG tablet Take 20 mg by mouth Daily.  1   • fenofibrate (TRICOR) 145 MG tablet Take 145 mg by mouth Daily.     • gabapentin (NEURONTIN) 600 MG tablet Take 600 mg by mouth 3 (Three) Times a Day.     • hydrALAZINE (APRESOLINE) 25 MG tablet Take 25 mg by mouth 3 (Three) Times a Day.     • HYDROcodone-acetaminophen (NORCO)  MG per tablet TK 1 T PO Q 6 H PRN FOR PAIN FOR 30 DAYS  0   • insulin aspart (novoLOG FLEXPEN) 100 UNIT/ML solution pen-injector sc pen Inject  under the skin 3 (Three) Times a Day With Meals.     • Insulin Glargine (LANTUS SOLOSTAR) 100 UNIT/ML injection pen Inject  under the skin.     • insulin glargine (LANTUS) 100 UNIT/ML injection Inject  under the skin Daily.     • isosorbide mononitrate (IMDUR) 30 MG 24 hr tablet Take 30 mg by mouth Daily.     • lisinopril (PRINIVIL,ZESTRIL) 40 MG tablet Take 40 mg by mouth Daily.     • metFORMIN (GLUCOPHAGE) 1000 MG tablet Take 1,000 mg by mouth 2 (Two) Times a Day With Meals.     • spironolactone (ALDACTONE) 25 MG tablet Take 25 mg by mouth Daily.     • Umeclidinium  "Bromide (INCRUSE ELLIPTA) 62.5 MCG/INH aerosol powder  Inhale.       No current facility-administered medications for this visit.          OBJECTIVE    Pulse 93   Ht 195.6 cm (77.01\")   Wt (!) 160 kg (352 lb)   SpO2 93%   BMI 41.73 kg/m²       Review of Systems   Constitutional: Negative.    HENT: Negative.    Eyes: Negative.    Respiratory: Positive for wheezing.    Cardiovascular: Positive for leg swelling.   Gastrointestinal: Negative.    Endocrine: Positive for heat intolerance.   Genitourinary: Negative.    Musculoskeletal: Positive for back pain and joint swelling.        Foot pain, ankle pain   Skin: Positive for wound.   Allergic/Immunologic: Negative.    Neurological: Positive for dizziness.   Hematological: Negative.    Psychiatric/Behavioral: The patient is nervous/anxious.          Physical Exam   Constitutional: she appears well-developed and well-nourished.   HEENT: Normocephalic. Atraumatic  CV: No tenderness. RRR  Resp: Non-labored respiration. No wheezes.   Lymphatic: No lymphadenopathy.   Psychiatric: she has a normal mood and affect. her   behavior is normal.      Lower Extremity Exam:  Vascular: DP/PT pulses palpable  Negative hair growth.   Minimal  edema  Neuro: Protective sensation absent, b/l.  DTRs intact  Integument: No lesions.  Foot with significant dirt and debris  Right foot ulcerations:  Right medial hallux IPJ - 1.4 x 1. 0.2. Exuberrant surrounding HPK. No PTB. No drainage.  Sub 5th MTPJ - 1.6 x 4.0 x 0.2. No PTB. No drainage.   Web spaces c/d/i  Musculoskeletal: LE muscle strength 5/5.   Gait normal  Mild hammertoe deformity toes 2-5 b/l.  Ankle ROM decreased without pain or crepitus  STJ ROM decreased    Right foot wound debridement:  Risks and benefits discussed.  Left hallux, sub 5th ulcer debrided of overlying desiccated skin and devitalized tissue with 15 blade and tissue nipper to level of subq  Pressure hemostasis obtained  Abx ointment and dsd " applied.            ASSESSMENT AND PLAN    Tayo was seen today for wound check.    Diagnoses and all orders for this visit:    Skin ulcer of right foot with fat layer exposed (CMS/HCC)    Skin ulcer of toe of right foot with fat layer exposed (CMS/HCC)    Diabetic polyneuropathy associated with type 2 diabetes mellitus (CMS/HCC)      -Comprehensive DM foot exam performed. Pt educated on importance of tight glucose control and daily foot checks.   -Wound debridement as above  -Stressed importance of offloading. Patient not agreeable to TCC at this time. Continue offloading CAM boot  -Dressing applied. Home dressing supplies ordered  -Recheck 1 week          This document has been electronically signed by Luc Leon DPM on January 20, 2019 4:16 PM     EMR Dragon/Transcription disclaimer:   Much of this encounter note is an electronic transcription/translation of spoken language to printed text. The electronic translation of spoken language may permit erroneous, or at times, nonsensical words or phrases to be inadvertently transcribed; Although I have reviewed the note for such errors, some may still exist.    Luc Leon DPM  1/20/2019  4:16 PM